# Patient Record
Sex: FEMALE | Race: WHITE | Employment: PART TIME
[De-identification: names, ages, dates, MRNs, and addresses within clinical notes are randomized per-mention and may not be internally consistent; named-entity substitution may affect disease eponyms.]

---

## 2017-07-22 ENCOUNTER — HEALTH MAINTENANCE LETTER (OUTPATIENT)
Age: 62
End: 2017-07-22

## 2021-04-27 ENCOUNTER — HOSPITAL ENCOUNTER (EMERGENCY)
Facility: CLINIC | Age: 66
Discharge: HOME | End: 2021-04-27
Attending: FAMILY MEDICINE | Admitting: FAMILY MEDICINE
Payer: OTHER GOVERNMENT

## 2021-04-27 VITALS
RESPIRATION RATE: 23 BRPM | DIASTOLIC BLOOD PRESSURE: 94 MMHG | TEMPERATURE: 98.7 F | SYSTOLIC BLOOD PRESSURE: 160 MMHG | BODY MASS INDEX: 26.63 KG/M2 | OXYGEN SATURATION: 100 % | HEART RATE: 87 BPM | WEIGHT: 170 LBS

## 2021-04-27 DIAGNOSIS — U07.1 2019 NOVEL CORONAVIRUS DISEASE (COVID-19): ICD-10-CM

## 2021-04-27 DIAGNOSIS — R06.02 SHORTNESS OF BREATH: ICD-10-CM

## 2021-04-27 DIAGNOSIS — I10 ESSENTIAL HYPERTENSION: ICD-10-CM

## 2021-04-27 PROCEDURE — 99284 EMERGENCY DEPT VISIT MOD MDM: CPT | Mod: 25 | Performed by: FAMILY MEDICINE

## 2021-04-27 PROCEDURE — 93005 ELECTROCARDIOGRAM TRACING: CPT | Performed by: FAMILY MEDICINE

## 2021-04-27 PROCEDURE — 93010 ELECTROCARDIOGRAM REPORT: CPT | Performed by: FAMILY MEDICINE

## 2021-04-27 PROCEDURE — 99283 EMERGENCY DEPT VISIT LOW MDM: CPT | Performed by: FAMILY MEDICINE

## 2021-04-27 NOTE — DISCHARGE INSTRUCTIONS
Your oxygen saturations are %.  It does not appear that you are having any significant oxygen efficiency from your coronavirus infection.  Try to get an oximeter and monitor your oxygen saturations over the next several days.  Return to the ED if your oxygen saturations drop below 90%.  Your blood pressure is elevated and your EKG is concerning for some longstanding hypertension.  Please follow-up with your primary clinic provider to monitor your blood pressures and see if you need medication within the next 1-2 weeks.  Take ibuprofen up to 600 mg 2-3 times a day for the next 2-3 days.  Try melatonin at nighttime to help you sleep.

## 2021-04-27 NOTE — ED PROVIDER NOTES
[facility] ED Provider Note   Patient: Veronica Palmer  MRN #:  [id number]  Date of Visit: April 27, 2021    CC:     Chief Complaint   Patient presents with     Covid Concern     HPI:  Veronica Palmer is a 66 year old female who presented to the emergency department with increasing shortness of breath over the past 2 days.  Patient states that she tested positive for coronavirus last Wednesday.  Her symptoms started the Sunday before.  She is now at approximately day 9 from the onset of her illness.  She initially developed body aches, sore throat, nonproductive cough and low-grade fever.  She did not lose her sense of smell or taste.  She had slight diarrhea but no vomiting.  Over the last 2 nights she has had significant body aches that has kept her from being able to sleep.  This morning she asked her sister to drive her into the emergency department because she was feeling more short of breath.  Patient feels short of breath at rest, but also triggered with activity.  There does not appear to be any positional changes.  She has been told that her blood pressures have been borderline elevated in the past but she is not on any medications.  Patient denies any chronic health problems otherwise, does not smoke, has no personal history of COPD or asthma, and has no known heart disease.    Problem List:  Patient Active Problem List    Diagnosis Date Noted     CARDIOVASCULAR SCREENING; LDL GOAL LESS THAN 160 10/31/2010     Priority: Medium       Past Medical History:   Diagnosis Date     NO ACTIVE PROBLEMS        MEDS: IBUPROFEN        ALLERGIES:    Allergies   Allergen Reactions     Diphenhydramine Hcl Hives     benadryl       Past Surgical History:   Procedure Laterality Date     NO HISTORY OF SURGERY         Social History     Tobacco Use     Smoking status: Never Smoker     Smokeless tobacco: Never Used   Substance Use Topics     Alcohol use: No      Drug use: No         Review of Systems   Except as noted in HPI, all other systems were reviewed and are negative    Physical Exam     Vitals were reviewed  Patient Vitals for the past 12 hrs:   BP Temp Temp src Pulse Resp SpO2 Weight   04/27/21 0515 (!) 160/94 -- -- 87 23 100 % --   04/27/21 0505 (!) 183/111 98.7  F (37.1  C) Oral 82 20 100 % 77.1 kg (170 lb)     GENERAL APPEARANCE: Alert and oriented x3, no apparent distress at rest.  No respiratory difficulty  FACE: normal facies  EYES: Pupils are equal  HENT: normal external exam  NECK: no adenopathy or asymmetry  RESP: normal respiratory effort; clear breath sounds bilaterally  CV: regular rate and rhythm; no significant murmurs, gallops or rubs  ABD: soft, moderately obese, no tenderness; no rebound or guarding; bowel sounds are normal  EXT: No calf tenderness or pitting edema; no clubbing or cyanosis  SKIN: no worrisome rash  NEURO: no facial droop; no focal deficits, speech is normal and in full sentences        Available Lab/Imaging Results   No results found for this or any previous visit (from the past 24 hour(s)).    EKG reviewed by me: Normal sinus rhythm with heart rate of 81.  NY interval of 178 ms, QT interval of 382 ms, QRS duration of 100 ms, normal axis.  Left axis anterior fascicular block.  Voltage criteria for LVH.  Nonspecific ST depression seen with left ventricular hypertrophy strain.  No acute ST elevation.  No definite ischemic changes.       Impression     Final diagnoses:   2019 novel coronavirus disease (COVID-19)   Shortness of breath         ED Course & Medical Decision Making   Veronica Palmer is a 66 year old female who presented to the emergency department with feeling short of breath in the setting of known COVID-19 infection.  Patient is at approximately day #9 since onset of her symptoms.  She tested positive for coronavirus last Wednesday, found out her test result on Thursday, and states that she was feeling better over  the weekend, but over the last 2 nights has felt more short of breath.  Part of this is that she has not been able to sleep due to body aches and this morning was more anxious.  Patient was seen shortly after arrival.  Initial temperature is 98.7, blood pressure 183/111, heart rate of 82, respiratory rate of 20 with 100% oxygen saturation.  Patient's lung exam reveals no adventitious breath sounds.  Cardiovascular exam is unremarkable without murmurs.  EKG reveals normal sinus rhythm.  There is a left axis anterior fascicular block.  There is voltage criteria for LVH.  Nonspecific ST depression.  The patient's repeat blood pressure is 160/94.  Patient likely has undiagnosed and untreated hypertension.  I will have the patient return to see her primary care provider within the next 1-2 weeks.  In the meantime, continue close surveillance of her COVID-19 infection.  Patient was advised to check oxygen saturations at home with an oximeter.  Her brother has an oximeter that she can use.  Return to the ED with low oxygen saturations, or if she develops any other new or worsening symptoms.  Patient expressed understanding and agreement with plan.  Please see discharge instructions below.        Written after-visit summary and instructions were given at the time of discharge.    Follow up Plan:   Catalino Dowd MD  [address] [place] [address] [phone]    In 1 week      [facility] Emergency Dept  [address] [place] [address] [phone]    If symptoms worsen      Discharge Instructions:   Your oxygen saturations are %.  It does not appear that you are having any significant oxygen efficiency from your coronavirus infection.  Try to get an oximeter and monitor your oxygen saturations over the next several days.  Return to the ED if your oxygen saturations drop below 90%.  Your blood pressure is elevated and your EKG is concerning for some longstanding  hypertension.  Please follow-up with your primary clinic provider to monitor your blood pressures and see if you need medication within the next 1-2 weeks.  Take ibuprofen up to 600 mg 2-3 times a day for the next 2-3 days.  Try melatonin at nighttime to help you sleep.       Disclaimer: This note consists of words and symbols derived from keyboarding and dictation using voice recognition software.  As a result, there may be errors that have gone undetected.  Please consider this when interpreting information found in this note.       Allison Mistry MD  04/27/21 0571

## 2021-04-27 NOTE — ED TRIAGE NOTES
Presents to ED with concerns of chest tightness and shortness of breath. Patient tested positive for COVID on 4/22. Patient has been having a hard time sleeping due to shortness of breath.